# Patient Record
Sex: MALE
[De-identification: names, ages, dates, MRNs, and addresses within clinical notes are randomized per-mention and may not be internally consistent; named-entity substitution may affect disease eponyms.]

---

## 2018-04-29 ENCOUNTER — HOSPITAL ENCOUNTER (EMERGENCY)
Dept: HOSPITAL 31 - C.ER | Age: 53
Discharge: HOME | End: 2018-04-29
Payer: COMMERCIAL

## 2018-04-29 VITALS
DIASTOLIC BLOOD PRESSURE: 62 MMHG | RESPIRATION RATE: 16 BRPM | SYSTOLIC BLOOD PRESSURE: 108 MMHG | HEART RATE: 86 BPM | TEMPERATURE: 100.3 F

## 2018-04-29 VITALS — OXYGEN SATURATION: 96 %

## 2018-04-29 DIAGNOSIS — J11.1: Primary | ICD-10-CM

## 2018-04-29 LAB
ALBUMIN SERPL-MCNC: 3.7 G/DL (ref 3.5–5)
ALBUMIN/GLOB SERPL: 1 {RATIO} (ref 1–2.1)
ALT SERPL-CCNC: 32 U/L (ref 21–72)
AST SERPL-CCNC: 35 U/L (ref 17–59)
BASOPHILS # BLD AUTO: 0.1 K/UL (ref 0–0.2)
BASOPHILS NFR BLD: 0.3 % (ref 0–2)
BUN SERPL-MCNC: 11 MG/DL (ref 9–20)
CALCIUM SERPL-MCNC: 8.3 MG/DL (ref 8.6–10.4)
EOSINOPHIL # BLD AUTO: 0 K/UL (ref 0–0.7)
EOSINOPHIL NFR BLD: 0 % (ref 0–4)
ERYTHROCYTE [DISTWIDTH] IN BLOOD BY AUTOMATED COUNT: 13.1 % (ref 11.5–14.5)
GFR NON-AFRICAN AMERICAN: > 60
HGB BLD-MCNC: 13.2 G/DL (ref 12–18)
HYPOCHROMIC: SLIGHT
LG PLATELETS BLD QL SMEAR: PRESENT
LYMPHOCYTE: 3 % (ref 20–40)
LYMPHOCYTES # BLD AUTO: 0.9 K/UL (ref 1–4.3)
LYMPHOCYTES NFR BLD AUTO: 5.4 % (ref 20–40)
MCH RBC QN AUTO: 29 PG (ref 27–31)
MCHC RBC AUTO-ENTMCNC: 34.7 G/DL (ref 33–37)
MCV RBC AUTO: 83.5 FL (ref 80–94)
MONOCYTE: 5 % (ref 0–10)
MONOCYTES # BLD: 0.9 K/UL (ref 0–0.8)
MONOCYTES NFR BLD: 5.3 % (ref 0–10)
NEUTROPHILS # BLD: 15.4 K/UL (ref 1.8–7)
NEUTROPHILS NFR BLD AUTO: 89 % (ref 50–75)
NEUTROPHILS NFR BLD AUTO: 91 % (ref 50–75)
NEUTS BAND NFR BLD: 1 % (ref 0–2)
NRBC BLD AUTO-RTO: 0 % (ref 0–2)
OVALOCYTES BLD QL SMEAR: SLIGHT
PLATELET # BLD EST: NORMAL 10*3/UL
PLATELET # BLD: 231 K/UL (ref 130–400)
PMV BLD AUTO: 7.4 FL (ref 7.2–11.7)
POIKILOCYTOSIS BLD QL SMEAR: SLIGHT
RBC # BLD AUTO: 4.57 MIL/UL (ref 4.4–5.9)
TOTAL CELLS COUNTED BLD: 100
WBC # BLD AUTO: 17.3 K/UL (ref 4.8–10.8)

## 2018-04-29 PROCEDURE — 82948 REAGENT STRIP/BLOOD GLUCOSE: CPT

## 2018-04-29 PROCEDURE — 71045 X-RAY EXAM CHEST 1 VIEW: CPT

## 2018-04-29 PROCEDURE — 96374 THER/PROPH/DIAG INJ IV PUSH: CPT

## 2018-04-29 PROCEDURE — 87804 INFLUENZA ASSAY W/OPTIC: CPT

## 2018-04-29 PROCEDURE — 99285 EMERGENCY DEPT VISIT HI MDM: CPT

## 2018-04-29 PROCEDURE — 80053 COMPREHEN METABOLIC PANEL: CPT

## 2018-04-29 PROCEDURE — 96361 HYDRATE IV INFUSION ADD-ON: CPT

## 2018-04-29 PROCEDURE — 85025 COMPLETE CBC W/AUTO DIFF WBC: CPT

## 2018-04-29 PROCEDURE — 93005 ELECTROCARDIOGRAM TRACING: CPT

## 2018-04-29 NOTE — C.PDOC
Time Seen by Provider: 04/29/18 15:32


Chief Complaint (Nursing): Flu-like Symptoms





Past Medical History


Vital Signs: 





 Last Vital Signs











Temp  102.9 F H  04/29/18 15:21


 


Pulse  105 H  04/29/18 15:21


 


Resp  20   04/29/18 15:21


 


BP  136/82   04/29/18 15:21


 


Pulse Ox  98   04/29/18 15:21














- Social History


Hx Alcohol Use: No


Hx Substance Use: No





- Immunization History


Hx Tetanus Toxoid Vaccination: No


Hx Influenza Vaccination: No


Hx Pneumococcal Vaccination: No





ED Course And Treatment





- Laboratory Results


Result Diagrams: 


 04/29/18 15:58





 04/29/18 15:58


O2 Sat by Pulse Oximetry: 98





Disposition


Counseled Patient/Family Regarding: Studies Performed, Diagnosis, Need For 

Followup, Rx Given





- Disposition


Referrals: 


[facility] [Outside]


Disposition: HOME/ ROUTINE


Disposition Time: 17:15


Condition: STABLE


Additional Instructions: 


FOLLOW UP WITH YOUR DOCTOR OR CLINIC IN 1-2 DAYS





DRINK PLENTY OF FLUIDS





USE MEDICATION AS DIRECTED





RETURN TO EMERGENCY ROOOM IF SYMPTOMS WORSEN








SEGUIMIENTO CON WHITLOCK MDICO O CLNICA EN 1-2 QUIÑONEZ





BEBER MUCHO LQUIDO





USE MEDICAMENTOS SEGN SE INDICA





VUELVA AL EMPLAZAMIENTO DE EMERGENCIA SI LOS SNTOMAS EMPEORAN





Prescriptions: 


Naproxen 375 mg PO BID PRN #20 tablet


 PRN Reason: pain


Instructions:  Viral Syndrome (DC)


Forms:  [facility] [facility] (English), Work Excuse


Print Language: Bulgarian





- Clinical Impression


Clinical Impression: 


 Influenza-like illness, Viral syndrome

## 2018-04-29 NOTE — RAD
PROCEDURE:  CHEST RADIOGRAPH, 1 VIEW



HISTORY:

Generalize weakness 



COMPARISON:

None available.



FINDINGS:



LUNGS:

Poor inspiration with low lung volumes, crowded bronchovascular 

markings and mild bibasilar atelectasis right greater than left.  

Slight elevation right hemidiaphragm.



PLEURA:

No pneumothorax or pleural fluid seen.



CARDIOVASCULAR:

Heart size appears mildly enlarged



OSSEOUS STRUCTURES:

No significant abnormalities.



VISUALIZED UPPER ABDOMEN:

Normal.



OTHER FINDINGS:

None. 



IMPRESSION:

Poor inspiration with low lung volumes, crowded bronchovascular 

markings and mild bibasilar atelectasis right greater than left.  

Slight elevation right hemidiaphragm.

## 2018-04-29 NOTE — C.PDOC
History Of Present Illness


53 year old male presents to the emergency department with complaints of body 

aches, headache, generalized weakness, runny nose, and mild pressure in his 

chest since yesterday.  He denies cough, shortness of breath, vomiting, diarrhea

, dysuria/hematuria, rash.  Patient denies any PMhx. 


Time Seen by Provider: 18 15:32


Chief Complaint (Nursing): Flu-like Symptoms


History Per: Patient


History/Exam Limitations: no limitations


Onset/Duration Of Symptoms: Days (1)


Current Symptoms Are (Timing): Still Present


Severity: Moderate


Location Of Pain/Discomfort: Diffuse


Quality Of Discomfort: Aching (body), Pressure (chest)


Associated Symptoms: denies: Vomiting, Diarrhea





Past Medical History


Reviewed: Historical Data, Nursing Documentation, Vital Signs


Vital Signs: 


 Last Vital Signs











Temp  100.3 F H  18 17:16


 


Pulse  86   18 17:16


 


Resp  16   18 17:16


 


BP  108/62   18 17:16


 


Pulse Ox  96   18 17:23














- Medical History


PMH: No Chronic Diseases


Surgical History: No Surg Hx


Family History: States: No Known Family Hx





- Social History


Hx Alcohol Use: No


Hx Substance Use: No





- Immunization History


Hx Tetanus Toxoid Vaccination: No


Hx Influenza Vaccination: No


Hx Pneumococcal Vaccination: No





Review Of Systems


Constitutional: Positive for: Weakness (generalized), Malaise


ENT: Positive for: Nose Congestion.  Negative for: Ear Pain, Throat Pain


Cardiovascular: Positive for: Other (pressure )


Respiratory: Positive for: Other (pressure).  Negative for: Cough, Shortness of 

Breath


Gastrointestinal: Negative for: Nausea, Vomiting, Abdominal Pain, Diarrhea


Genitourinary: Negative for: Dysuria


Skin: Negative for: Rash


Neurological: Positive for: Headache





Physical Exam





- Physical Exam


Appears: Well, Non-toxic, In Acute Distress (appears mildly uncomfortable)


Skin: Normal Color, Warm, Dry, No Rash


Eye(s): bilateral: Normal Inspection


Oral Mucosa: Moist


Throat: Normal, No Erythema, No Exudate


Neck: Normal, Supple, Other (no meningismus )


Cardiovascular: Rhythm Regular


Respiratory: Normal Breath Sounds, No Rales, No Rhonchi, No Wheezing


Gastrointestinal/Abdominal: Normal Exam, Bowel Sounds, Soft, No Tenderness


Extremity: Normal ROM, No Pedal Edema


Neurological/Psych: Oriented x3





ED Course And Treatment





- Laboratory Results


Result Diagrams: 


 18 15:58





 18 15:58


ECG: Interpreted By Me, Viewed By Me (sinus tachycardia 105 bpm, normal axis, 

no acute ST/T wave changes)


ECG Interpretation: Abnormal (tachycardic )


Rate From EC


O2 Sat by Pulse Oximetry: 96 (RA)


Pulse Ox Interpretation: Normal





- Radiology


CXR: Interpreted by Me, Viewed By Me


CXR Interpretation: Yes: No Acute Disease.  No: Infiltrates


Progress Note: Plan: Blood work, EKG, CXR ordered and reviewed.  Patient given 

IV NS bolus, PO tylenol for fever, IV toradol for pain.


Reevaluation Time: 17:20


Reassessment Condition: Improved (On reassessment, patient is resting 

comfortably and states he is feeling better.  Fever has dropped appropriately, 

and vitals have improved.  On exam, abdomen is soft and nontender.  CXR (-) for 

acute infiltrate.  Symptoms likely viral - patient given Rx for naprosyn, and 

instructed to get rest, drink plenty of fluids and to follow up with PMD/clinic 

in 1-2 days.  He understands he should return to ED if symptoms worsen.)





Disposition


Counseled Patient/Family Regarding: Studies Performed, Diagnosis, Need For 

Followup, Rx Given





- Disposition


Referrals: 


[facility] [Outside]


Disposition: HOME/ ROUTINE


Disposition Time: 17:20


Condition: STABLE


Additional Instructions: 


FOLLOW UP WITH YOUR DOCTOR OR CLINIC IN 1-2 DAYS





DRINK PLENTY OF FLUIDS





USE MEDICATION AS DIRECTED





RETURN TO EMERGENCY ROOOM IF SYMPTOMS WORSEN








SEGUIMIENTO CON WHITLCOK MDICO O CLNICA EN 1-2 QUIÑONEZ





BEBER MUCHO LQUIDO





USE MEDICAMENTOS SEGN SE INDICA





VUELVA AL EMPLAZAMIENTO DE EMERGENCIA SI LOS SNTOMAS EMPEORAN





Prescriptions: 


Naproxen 375 mg PO BID PRN #20 tablet


 PRN Reason: pain


Instructions:  Viral Syndrome (DC)


Forms:  [facility] (English), Work Excuse


Print Language: Liberian





- Clinical Impression


Clinical Impression: 


 Influenza-like illness, Viral syndrome








- Scribe Statement


The provider has reviewed the documentation as recorded by the Scribe (Roderick Cantu)


Provider Attestation: 


All medical record entries made by the Scribe were at my direction and 

personally dictated by me. I have reviewed the chart and agree that the record 

accurately reflects my personal performance of the history, physical exam, 

medical decision making, and the department course for this patient. I have 

also personally directed, reviewed, and agree with the discharge instructions 

and disposition.

## 2018-05-02 NOTE — CARD
--------------- APPROVED REPORT --------------





EKG Measurement

Heart [id number]

IA [id number]

[id number]

[id number]

[id number]



<Conclusion>

Sinus tachycardia

Otherwise normal ECG